# Patient Record
Sex: MALE | Race: AMERICAN INDIAN OR ALASKA NATIVE | NOT HISPANIC OR LATINO | URBAN - NONMETROPOLITAN AREA
[De-identification: names, ages, dates, MRNs, and addresses within clinical notes are randomized per-mention and may not be internally consistent; named-entity substitution may affect disease eponyms.]

---

## 2017-09-12 ENCOUNTER — RX CHECK (OUTPATIENT)
Dept: URBAN - NONMETROPOLITAN AREA CLINIC 6 | Facility: CLINIC | Age: 37
End: 2017-09-12
Payer: COMMERCIAL

## 2017-09-12 DIAGNOSIS — S05.01XA CORNEAL ABRASION OF RIGHT EYE, INITIAL ENCOUNTER: Primary | ICD-10-CM

## 2017-09-12 DIAGNOSIS — S05.02XA CORNEAL ABRASION OF LEFT EYE, INITIAL ENCOUNTER: ICD-10-CM

## 2017-09-12 PROCEDURE — 92012 INTRM OPH EXAM EST PATIENT: CPT | Performed by: OPTOMETRIST

## 2018-02-07 ENCOUNTER — FOLLOW UP ESTABLISHED (OUTPATIENT)
Dept: URBAN - NONMETROPOLITAN AREA CLINIC 6 | Facility: CLINIC | Age: 38
End: 2018-02-07
Payer: COMMERCIAL

## 2018-02-07 DIAGNOSIS — H52.13 MYOPIA, BILATERAL: ICD-10-CM

## 2018-02-07 PROCEDURE — 92014 COMPRE OPH EXAM EST PT 1/>: CPT | Performed by: OPTOMETRIST

## 2018-02-07 ASSESSMENT — VISUAL ACUITY
OD: 20/20
OS: 20/20

## 2018-02-07 ASSESSMENT — INTRAOCULAR PRESSURE
OS: 15
OD: 15

## 2019-04-10 ENCOUNTER — FOLLOW UP ESTABLISHED (OUTPATIENT)
Dept: URBAN - NONMETROPOLITAN AREA CLINIC 6 | Facility: CLINIC | Age: 39
End: 2019-04-10
Payer: COMMERCIAL

## 2019-04-10 DIAGNOSIS — Z79.4 LONG TERM (CURRENT) USE OF INSULIN: ICD-10-CM

## 2019-04-10 PROCEDURE — 92015 DETERMINE REFRACTIVE STATE: CPT | Performed by: OPTOMETRIST

## 2019-04-10 PROCEDURE — 92014 COMPRE OPH EXAM EST PT 1/>: CPT | Performed by: OPTOMETRIST

## 2019-04-10 ASSESSMENT — VISUAL ACUITY
OS: 20/20
OD: 20/20

## 2019-04-10 ASSESSMENT — INTRAOCULAR PRESSURE
OD: 16
OS: 16

## 2020-08-19 ENCOUNTER — FOLLOW UP ESTABLISHED (OUTPATIENT)
Dept: URBAN - NONMETROPOLITAN AREA CLINIC 6 | Facility: CLINIC | Age: 40
End: 2020-08-19
Payer: COMMERCIAL

## 2020-08-19 DIAGNOSIS — E11.9 TYPE 2 DIABETES MELLITUS WITHOUT COMPLICATIONS: ICD-10-CM

## 2020-08-19 PROCEDURE — 92014 COMPRE OPH EXAM EST PT 1/>: CPT | Performed by: OPTOMETRIST

## 2020-08-19 ASSESSMENT — INTRAOCULAR PRESSURE
OS: 12
OD: 12

## 2020-08-19 ASSESSMENT — VISUAL ACUITY
OS: 20/20
OD: 20/20

## 2021-09-28 ENCOUNTER — OFFICE VISIT (OUTPATIENT)
Dept: URBAN - NONMETROPOLITAN AREA CLINIC 6 | Facility: CLINIC | Age: 41
End: 2021-09-28
Payer: COMMERCIAL

## 2021-09-28 DIAGNOSIS — H25.13 AGE-RELATED NUCLEAR CATARACT, BILATERAL: ICD-10-CM

## 2021-09-28 PROCEDURE — 92014 COMPRE OPH EXAM EST PT 1/>: CPT | Performed by: OPTOMETRIST

## 2021-09-28 ASSESSMENT — INTRAOCULAR PRESSURE
OS: 13
OD: 13

## 2021-09-28 ASSESSMENT — VISUAL ACUITY
OD: 20/20
OS: 20/20

## 2022-09-28 ENCOUNTER — OFFICE VISIT (OUTPATIENT)
Dept: URBAN - NONMETROPOLITAN AREA CLINIC 6 | Facility: CLINIC | Age: 42
End: 2022-09-28
Payer: COMMERCIAL

## 2022-09-28 DIAGNOSIS — Z79.4 LONG TERM (CURRENT) USE OF INSULIN: ICD-10-CM

## 2022-09-28 DIAGNOSIS — E11.9 TYPE 2 DIABETES MELLITUS WITHOUT COMPLICATIONS: ICD-10-CM

## 2022-09-28 DIAGNOSIS — H52.13 MYOPIA, BILATERAL: Primary | ICD-10-CM

## 2022-09-28 DIAGNOSIS — H25.13 AGE-RELATED NUCLEAR CATARACT, BILATERAL: ICD-10-CM

## 2022-09-28 PROCEDURE — 92310 CONTACT LENS FITTING OU: CPT | Performed by: OPTOMETRIST

## 2022-09-28 PROCEDURE — 92014 COMPRE OPH EXAM EST PT 1/>: CPT | Performed by: OPTOMETRIST

## 2022-09-28 ASSESSMENT — VISUAL ACUITY
OS: 20/20
OD: 20/20

## 2022-09-28 ASSESSMENT — INTRAOCULAR PRESSURE
OS: 14
OD: 14

## 2022-09-28 NOTE — IMPRESSION/PLAN
Impression: Type 2 diabetes mellitus without complications: M77.4. Plan: Diabetes insulin: no non-proliferative diabetic retinopathy, no signs of neovascularization noted. Discussed ocular and systemic benefits of blood sugar control. Continue management under PCP.

## 2023-08-21 ENCOUNTER — OFFICE VISIT (OUTPATIENT)
Dept: URBAN - NONMETROPOLITAN AREA CLINIC 6 | Facility: CLINIC | Age: 43
End: 2023-08-21
Payer: COMMERCIAL

## 2023-08-21 DIAGNOSIS — E11.9 TYPE 2 DIABETES MELLITUS WITHOUT COMPLICATIONS: ICD-10-CM

## 2023-08-21 DIAGNOSIS — H25.13 AGE-RELATED NUCLEAR CATARACT, BILATERAL: ICD-10-CM

## 2023-08-21 DIAGNOSIS — H52.13 MYOPIA, BILATERAL: Primary | ICD-10-CM

## 2023-08-21 PROCEDURE — 92014 COMPRE OPH EXAM EST PT 1/>: CPT | Performed by: OPTOMETRIST

## 2023-08-21 ASSESSMENT — VISUAL ACUITY
OD: 20/20
OS: 20/20

## 2023-08-21 ASSESSMENT — INTRAOCULAR PRESSURE
OD: 18
OS: 18

## 2025-02-05 ENCOUNTER — OFFICE VISIT (OUTPATIENT)
Dept: URBAN - NONMETROPOLITAN AREA CLINIC 6 | Facility: CLINIC | Age: 45
End: 2025-02-05
Payer: COMMERCIAL

## 2025-02-05 DIAGNOSIS — E11.9 TYPE 2 DIABETES MELLITUS WITHOUT COMPLICATIONS: ICD-10-CM

## 2025-02-05 DIAGNOSIS — H52.13 MYOPIA, BILATERAL: Primary | ICD-10-CM

## 2025-02-05 PROCEDURE — 92310 CONTACT LENS FITTING OU: CPT | Performed by: OPTOMETRIST

## 2025-02-05 PROCEDURE — 92014 COMPRE OPH EXAM EST PT 1/>: CPT | Performed by: OPTOMETRIST

## 2025-02-05 ASSESSMENT — VISUAL ACUITY
OD: 20/20
OS: 20/20

## 2025-02-05 ASSESSMENT — INTRAOCULAR PRESSURE
OS: 16
OD: 15

## 2025-02-05 ASSESSMENT — KERATOMETRY
OS: 43.37
OD: 42.98